# Patient Record
Sex: FEMALE | Race: WHITE | NOT HISPANIC OR LATINO | Employment: FULL TIME | URBAN - METROPOLITAN AREA
[De-identification: names, ages, dates, MRNs, and addresses within clinical notes are randomized per-mention and may not be internally consistent; named-entity substitution may affect disease eponyms.]

---

## 2023-06-22 ENCOUNTER — OFFICE VISIT (OUTPATIENT)
Dept: PODIATRY | Facility: CLINIC | Age: 53
End: 2023-06-22
Payer: MEDICARE

## 2023-06-22 VITALS
SYSTOLIC BLOOD PRESSURE: 124 MMHG | WEIGHT: 151.81 LBS | BODY MASS INDEX: 26.9 KG/M2 | HEIGHT: 63 IN | HEART RATE: 70 BPM | DIASTOLIC BLOOD PRESSURE: 71 MMHG

## 2023-06-22 DIAGNOSIS — R26.2 DIFFICULTY WALKING: ICD-10-CM

## 2023-06-22 DIAGNOSIS — M79.671 RIGHT FOOT PAIN: ICD-10-CM

## 2023-06-22 DIAGNOSIS — M67.471 GANGLION CYST OF RIGHT FOOT: Primary | ICD-10-CM

## 2023-06-22 PROCEDURE — 1159F PR MEDICATION LIST DOCUMENTED IN MEDICAL RECORD: ICD-10-PCS | Mod: CPTII,S$GLB,, | Performed by: PODIATRIST

## 2023-06-22 PROCEDURE — 3008F PR BODY MASS INDEX (BMI) DOCUMENTED: ICD-10-PCS | Mod: CPTII,S$GLB,, | Performed by: PODIATRIST

## 2023-06-22 PROCEDURE — 3074F SYST BP LT 130 MM HG: CPT | Mod: CPTII,S$GLB,, | Performed by: PODIATRIST

## 2023-06-22 PROCEDURE — 20600 SMALL JOINT ASPIRATION/INJECTION: R INTERTARSAL: ICD-10-PCS | Mod: RT,S$GLB,, | Performed by: PODIATRIST

## 2023-06-22 PROCEDURE — 3078F PR MOST RECENT DIASTOLIC BLOOD PRESSURE < 80 MM HG: ICD-10-PCS | Mod: CPTII,S$GLB,, | Performed by: PODIATRIST

## 2023-06-22 PROCEDURE — 1159F MED LIST DOCD IN RCRD: CPT | Mod: CPTII,S$GLB,, | Performed by: PODIATRIST

## 2023-06-22 PROCEDURE — 3008F BODY MASS INDEX DOCD: CPT | Mod: CPTII,S$GLB,, | Performed by: PODIATRIST

## 2023-06-22 PROCEDURE — 99203 OFFICE O/P NEW LOW 30 MIN: CPT | Mod: 25,S$GLB,, | Performed by: PODIATRIST

## 2023-06-22 PROCEDURE — 20600 DRAIN/INJ JOINT/BURSA W/O US: CPT | Mod: RT,S$GLB,, | Performed by: PODIATRIST

## 2023-06-22 PROCEDURE — 99203 PR OFFICE/OUTPT VISIT, NEW, LEVL III, 30-44 MIN: ICD-10-PCS | Mod: 25,S$GLB,, | Performed by: PODIATRIST

## 2023-06-22 PROCEDURE — 3074F PR MOST RECENT SYSTOLIC BLOOD PRESSURE < 130 MM HG: ICD-10-PCS | Mod: CPTII,S$GLB,, | Performed by: PODIATRIST

## 2023-06-22 PROCEDURE — 3078F DIAST BP <80 MM HG: CPT | Mod: CPTII,S$GLB,, | Performed by: PODIATRIST

## 2023-06-22 RX ORDER — ERENUMAB-AOOE 140 MG/ML
INJECTION, SOLUTION SUBCUTANEOUS
COMMUNITY
Start: 2023-06-13

## 2023-06-22 RX ORDER — ALBUTEROL SULFATE 90 UG/1
AEROSOL, METERED RESPIRATORY (INHALATION)
COMMUNITY
Start: 2023-02-23

## 2023-06-22 RX ORDER — TRIAMCINOLONE ACETONIDE 0.25 MG/G
CREAM TOPICAL
COMMUNITY
Start: 2023-06-05

## 2023-06-22 RX ORDER — LIDOCAINE HYDROCHLORIDE 10 MG/ML
1 INJECTION INFILTRATION; PERINEURAL
Status: DISCONTINUED | OUTPATIENT
Start: 2023-06-22 | End: 2023-06-22 | Stop reason: HOSPADM

## 2023-06-22 RX ORDER — ERGOCALCIFEROL 1.25 MG/1
50000 CAPSULE ORAL
COMMUNITY
Start: 2023-06-05 | End: 2023-09-03

## 2023-06-22 RX ORDER — EPINEPHRINE 0.3 MG/.3ML
INJECTION SUBCUTANEOUS
COMMUNITY
Start: 2023-06-09

## 2023-06-22 RX ORDER — BUPROPION HYDROCHLORIDE 150 MG/1
150 TABLET, EXTENDED RELEASE ORAL 2 TIMES DAILY
COMMUNITY
Start: 2023-06-05

## 2023-06-22 RX ORDER — KETOROLAC TROMETHAMINE 10 MG/1
10 TABLET, FILM COATED ORAL EVERY 6 HOURS PRN
COMMUNITY
Start: 2023-05-13

## 2023-06-22 RX ORDER — METHOCARBAMOL 750 MG/1
750 TABLET, FILM COATED ORAL
COMMUNITY
Start: 2023-06-13

## 2023-06-22 RX ORDER — OXYCODONE HYDROCHLORIDE 20 MG/1
1 TABLET ORAL 4 TIMES DAILY PRN
COMMUNITY
Start: 2023-06-17

## 2023-06-22 RX ORDER — ERENUMAB-AOOE 70 MG/ML
INJECTION SUBCUTANEOUS
COMMUNITY
Start: 2023-05-16

## 2023-06-22 RX ORDER — FLUTICASONE PROPIONATE 50 MCG
50 SPRAY, SUSPENSION (ML) NASAL
COMMUNITY
Start: 2023-06-09

## 2023-06-22 RX ORDER — ASPIRIN 325 MG
1250 TABLET, DELAYED RELEASE (ENTERIC COATED) ORAL
COMMUNITY
Start: 2023-06-04

## 2023-06-22 RX ORDER — DEXAMETHASONE SODIUM PHOSPHATE 4 MG/ML
4 INJECTION, SOLUTION INTRA-ARTICULAR; INTRALESIONAL; INTRAMUSCULAR; INTRAVENOUS; SOFT TISSUE
Status: DISCONTINUED | OUTPATIENT
Start: 2023-06-22 | End: 2023-06-22 | Stop reason: HOSPADM

## 2023-06-22 RX ORDER — CLONIDINE HYDROCHLORIDE 0.1 MG/1
0.1 TABLET ORAL NIGHTLY
COMMUNITY
Start: 2023-06-09

## 2023-06-22 RX ORDER — PREDNISONE 10 MG/1
TABLET ORAL
COMMUNITY
Start: 2023-06-09

## 2023-06-22 RX ORDER — OMEGA-3-ACID ETHYL ESTERS 1 G/1
2 CAPSULE, LIQUID FILLED ORAL 2 TIMES DAILY
COMMUNITY
Start: 2023-06-13

## 2023-06-22 RX ORDER — ONDANSETRON 4 MG/1
4 TABLET, ORALLY DISINTEGRATING ORAL EVERY 8 HOURS PRN
COMMUNITY
Start: 2023-06-13

## 2023-06-22 RX ORDER — DOXYCYCLINE HYCLATE 100 MG
100 TABLET ORAL 2 TIMES DAILY
COMMUNITY
Start: 2023-05-13

## 2023-06-22 RX ORDER — DIPHENHYDRAMINE HCL 25 MG
25 CAPSULE ORAL
COMMUNITY
Start: 2023-06-14

## 2023-06-22 RX ADMIN — LIDOCAINE HYDROCHLORIDE 1 ML: 10 INJECTION INFILTRATION; PERINEURAL at 10:06

## 2023-06-22 RX ADMIN — DEXAMETHASONE SODIUM PHOSPHATE 4 MG: 4 INJECTION, SOLUTION INTRA-ARTICULAR; INTRALESIONAL; INTRAMUSCULAR; INTRAVENOUS; SOFT TISSUE at 10:06

## 2023-06-22 NOTE — PROGRESS NOTES
Subjective:     Patient ID: Mally Hicks is a 52 y.o. female.    Chief Complaint: Flat Foot    Mally is a 52 y.o. female who presents to the podiatry clinic  with complaint of  right foot pain. Onset of the symptoms was several weeks ago. Precipitating event:  growth of painful soft tissue lesion to right foot between 1st and 2nd metatarsals . Current symptoms include: ability to bear weight, but with some pain, swelling, and worsening symptoms after a period of activity. Aggravating factors:  direct contact to lesion and ill fitted shoes . Symptoms have gradually worsened. Patient has had prior foot problems. Evaluation to date: plain films: abnormal - arthritis of dorsal midfoot reported by patient . Treatment to date: none. Patients rates pain 5/10 on pain scale.    Review of Systems   Constitutional: Negative for chills and fever.   Cardiovascular:  Negative for chest pain and leg swelling.   Respiratory:  Negative for cough and shortness of breath.    Gastrointestinal:  Negative for diarrhea, nausea and vomiting.   Neurological:  Positive for numbness and paresthesias.      Objective:     Physical Exam  Vitals reviewed.   Constitutional:       General: She is not in acute distress.     Appearance: Normal appearance. She is not ill-appearing.   HENT:      Nose: Nose normal.   Cardiovascular:      Rate and Rhythm: Normal rate.   Pulmonary:      Effort: Pulmonary effort is normal. No respiratory distress.   Skin:     Capillary Refill: Capillary refill takes 2 to 3 seconds.   Neurological:      Mental Status: She is alert and oriented to person, place, and time.   Psychiatric:         Mood and Affect: Mood normal.         Behavior: Behavior normal.         Thought Content: Thought content normal.         Judgment: Judgment normal.      Neurologic: Protective and light touch sensation intact bilateral lower extremity, positive paresthesias reported to the right 1st and 2nd digits distally  Musculoskeletal:   5/5 muscle strength noted bilateral foot, ankle joint range of motion is full without pain, pain and tenderness with palpation of small soft tissue lesion between the  the right 1st and 2nd metatarsal bases  Dermatologic:  Soft tissue palpable possible fluid fill mass noted right 1st interspace, no rashes noted bilateral foot, no interdigital maceration noted bilateral foot   Vascular: DP and PT pulses palpable 2/4 bilateral foot, capillary fill time less than 3 seconds to distal aspect of the digits bilateral foot, no edema noted bilateral foot      Assessment:      Encounter Diagnoses   Name Primary?    Ganglion cyst of right foot Yes    Right foot pain     Difficulty walking      Plan:     Mally was seen today for flat foot.    Diagnoses and all orders for this visit:    Ganglion cyst of right foot  -     Small Joint Aspiration/Injection: R intertarsal    Right foot pain  -     Small Joint Aspiration/Injection: R intertarsal    Difficulty walking  -     Small Joint Aspiration/Injection: R intertarsal      I counseled the patient on her conditions, their implications and medical management.         1.  Patient was examined and evaluated.    2. Discussed patient etiology of ganglionic cyst.  Discussed possible aspiration of contents from the cyst.  Patient was made aware of possible recurrence over time.  Discussed briefly surgical intervention for excision of possible ganglionic cyst.  Discussed with patient benefits of local corticosteroid injection for improvement of pain complaints.  Patient was advised to discontinue shoe gear that apply pressure to the lesion.    Small Joint Aspiration/Injection: R intertarsal    Date/Time: 6/22/2023 10:00 AM  Performed by: Marlo Carlisle DPM  Authorized by: Marlo Carlisle DPM     Consent Done?:  Yes (Verbal)  Indications:  Pain  Location:  Foot  Site:  R intertarsal  Ultrasonic guidance for needle placement?: No    Needle size:  25 G  Approach:   Dorsal  Medications:  1 mL LIDOcaine HCL 10 mg/ml (1%) 10 mg/mL (1 %); 4 mg dexAMETHasone 4 mg/mL  Patient tolerance:  Patient tolerated the procedure well with no immediate complications    3. Patient was advised to continue with comfortable shoe gear both inside and outside the home.  Patient will decrease the amount of use of slides and flip-flops and limit barefoot walking.    4.  Discussed with patient possible numbness and tingling of the feet related to previous diagnose lumbar spine issues.  5. Patient will follow-up in 3 weeks or p.r.n. for complaints

## 2023-07-17 ENCOUNTER — OFFICE VISIT (OUTPATIENT)
Dept: PODIATRY | Facility: CLINIC | Age: 53
End: 2023-07-17
Payer: MEDICARE

## 2023-07-17 VITALS
DIASTOLIC BLOOD PRESSURE: 86 MMHG | HEART RATE: 86 BPM | BODY MASS INDEX: 26.75 KG/M2 | HEIGHT: 63 IN | SYSTOLIC BLOOD PRESSURE: 142 MMHG | WEIGHT: 151 LBS

## 2023-07-17 DIAGNOSIS — M67.471 GANGLION CYST OF RIGHT FOOT: Primary | ICD-10-CM

## 2023-07-17 DIAGNOSIS — M79.671 RIGHT FOOT PAIN: ICD-10-CM

## 2023-07-17 DIAGNOSIS — R26.2 DIFFICULTY WALKING: ICD-10-CM

## 2023-07-17 PROCEDURE — 3008F PR BODY MASS INDEX (BMI) DOCUMENTED: ICD-10-PCS | Mod: CPTII,S$GLB,, | Performed by: PODIATRIST

## 2023-07-17 PROCEDURE — 99213 PR OFFICE/OUTPT VISIT, EST, LEVL III, 20-29 MIN: ICD-10-PCS | Mod: S$GLB,,, | Performed by: PODIATRIST

## 2023-07-17 PROCEDURE — 3008F BODY MASS INDEX DOCD: CPT | Mod: CPTII,S$GLB,, | Performed by: PODIATRIST

## 2023-07-17 PROCEDURE — 3079F PR MOST RECENT DIASTOLIC BLOOD PRESSURE 80-89 MM HG: ICD-10-PCS | Mod: CPTII,S$GLB,, | Performed by: PODIATRIST

## 2023-07-17 PROCEDURE — 1160F RVW MEDS BY RX/DR IN RCRD: CPT | Mod: CPTII,S$GLB,, | Performed by: PODIATRIST

## 2023-07-17 PROCEDURE — 1159F MED LIST DOCD IN RCRD: CPT | Mod: CPTII,S$GLB,, | Performed by: PODIATRIST

## 2023-07-17 PROCEDURE — 3077F SYST BP >= 140 MM HG: CPT | Mod: CPTII,S$GLB,, | Performed by: PODIATRIST

## 2023-07-17 PROCEDURE — 3077F PR MOST RECENT SYSTOLIC BLOOD PRESSURE >= 140 MM HG: ICD-10-PCS | Mod: CPTII,S$GLB,, | Performed by: PODIATRIST

## 2023-07-17 PROCEDURE — 3079F DIAST BP 80-89 MM HG: CPT | Mod: CPTII,S$GLB,, | Performed by: PODIATRIST

## 2023-07-17 PROCEDURE — 1160F PR REVIEW ALL MEDS BY PRESCRIBER/CLIN PHARMACIST DOCUMENTED: ICD-10-PCS | Mod: CPTII,S$GLB,, | Performed by: PODIATRIST

## 2023-07-17 PROCEDURE — 99213 OFFICE O/P EST LOW 20 MIN: CPT | Mod: S$GLB,,, | Performed by: PODIATRIST

## 2023-07-17 PROCEDURE — 1159F PR MEDICATION LIST DOCUMENTED IN MEDICAL RECORD: ICD-10-PCS | Mod: CPTII,S$GLB,, | Performed by: PODIATRIST

## 2023-07-17 RX ORDER — OMEGA-3-ACID ETHYL ESTERS 1 G/1
4000 CAPSULE, LIQUID FILLED ORAL
COMMUNITY
Start: 2023-07-10 | End: 2024-07-04

## 2023-07-27 NOTE — PROGRESS NOTES
Subjective:     Patient ID: Mally Hicks is a 52 y.o. female.    Chief Complaint: Foot Pain    Mally is a 52 y.o. female who presents to the podiatry clinic  with complaint of  right foot pain. Onset of the symptoms was several weeks ago. Precipitating event:  growth of painful soft tissue lesion to right foot between 1st and 2nd metatarsals . Current symptoms include: ability to bear weight, but with some pain, swelling, and worsening symptoms after a period of activity. Aggravating factors:  direct contact to lesion and ill fitted shoes . Symptoms have gradually worsened. Patient has had prior foot problems. Evaluation to date: plain films: abnormal - arthritis of dorsal midfoot reported by patient . Treatment to date: aspiration of cyst with steroid injection which was very effective. Patients rates pain 0/10 on pain scale.      Review of Systems   Constitutional: Negative for chills and fever.   Cardiovascular:  Negative for chest pain and leg swelling.   Respiratory:  Negative for cough and shortness of breath.    Gastrointestinal:  Negative for diarrhea, nausea and vomiting.      Objective:     Physical Exam  Vitals reviewed.   Constitutional:       General: She is not in acute distress.     Appearance: Normal appearance. She is not ill-appearing.   HENT:      Nose: Nose normal.   Cardiovascular:      Rate and Rhythm: Normal rate.   Pulmonary:      Effort: Pulmonary effort is normal. No respiratory distress.   Skin:     Capillary Refill: Capillary refill takes 2 to 3 seconds.   Neurological:      Mental Status: She is alert and oriented to person, place, and time.   Psychiatric:         Mood and Affect: Mood normal.         Behavior: Behavior normal.         Thought Content: Thought content normal.         Judgment: Judgment normal.     Neurologic: Protective and light touch sensation intact bilateral lower extremity, positive paresthesias reported to the right 1st and 2nd digits  distally  Musculoskeletal:  5/5 muscle strength noted bilateral foot, ankle joint range of motion is full without pain, NO pain and tenderness with palpation of small soft tissue lesion between the  the right 1st and 2nd metatarsal bases  Dermatologic:  Soft tissue palpable possible fluid fill mass noted right 1st interspace with decreased size and no pain, no rashes noted bilateral foot, no interdigital maceration noted bilateral foot   Vascular: DP and PT pulses palpable 2/4 bilateral foot, capillary fill time less than 3 seconds to distal aspect of the digits bilateral foot, no edema noted bilateral foot    Assessment:      Encounter Diagnoses   Name Primary?    Ganglion cyst of right foot Yes    Right foot pain     Difficulty walking      Plan:     Mally was seen today for foot pain.    Diagnoses and all orders for this visit:    Ganglion cyst of right foot    Right foot pain    Difficulty walking      I counseled the patient on her conditions, their implications and medical management.           1.  Patient was examined and evaluated.    2. Again Discussed with patient etiology of ganglionic cyst.  Patient was made aware of possible recurrence over time.  Discussed briefly surgical intervention for excision of possible ganglionic cyst.  Discussed with patient benefits of local corticosteroid injection for improvement of pain complaints.  Patient was advised to discontinue shoe gear that apply pressure to the lesion.    3. Patient was advised to continue with comfortable shoe gear both inside and outside the home.  Patient will decrease the amount of use of slides and flip-flops and limit barefoot walking.    4. Again Discussed with patient possible numbness and tingling of the feet related to previously diagnosed lumbar spine issues.  5. Patient will follow-up r p.r.n. for complaints